# Patient Record
Sex: FEMALE | Race: BLACK OR AFRICAN AMERICAN | NOT HISPANIC OR LATINO | URBAN - METROPOLITAN AREA
[De-identification: names, ages, dates, MRNs, and addresses within clinical notes are randomized per-mention and may not be internally consistent; named-entity substitution may affect disease eponyms.]

---
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
## 2018-10-14 ENCOUNTER — EMERGENCY (EMERGENCY)
Facility: HOSPITAL | Age: 18
LOS: 1 days | Discharge: HOME | End: 2018-10-14
Admitting: EMERGENCY MEDICINE
Payer: MEDICAID

## 2018-10-14 VITALS
HEART RATE: 87 BPM | OXYGEN SATURATION: 99 % | TEMPERATURE: 99 F | DIASTOLIC BLOOD PRESSURE: 62 MMHG | RESPIRATION RATE: 20 BRPM | SYSTOLIC BLOOD PRESSURE: 125 MMHG

## 2018-10-14 PROBLEM — F32.9 MAJOR DEPRESSIVE DISORDER, SINGLE EPISODE, UNSPECIFIED: Chronic | Status: ACTIVE | Noted: 2017-04-15

## 2018-10-14 LAB
APPEARANCE UR: CLEAR — SIGNIFICANT CHANGE UP
BACTERIA # UR AUTO: NEGATIVE — SIGNIFICANT CHANGE UP
BILIRUB UR-MCNC: NEGATIVE — SIGNIFICANT CHANGE UP
BLOOD UR QL VISUAL: HIGH
COLOR SPEC: YELLOW — SIGNIFICANT CHANGE UP
GLUCOSE UR-MCNC: NEGATIVE — SIGNIFICANT CHANGE UP
HYALINE CASTS # UR AUTO: NEGATIVE — SIGNIFICANT CHANGE UP
KETONES UR-MCNC: NEGATIVE — SIGNIFICANT CHANGE UP
LEUKOCYTE ESTERASE UR-ACNC: NEGATIVE — SIGNIFICANT CHANGE UP
NITRITE UR-MCNC: NEGATIVE — SIGNIFICANT CHANGE UP
PH UR: 6 — SIGNIFICANT CHANGE UP (ref 5–8)
PROT UR-MCNC: 20 — SIGNIFICANT CHANGE UP
RBC CASTS # UR COMP ASSIST: >50 — HIGH (ref 0–?)
SP GR SPEC: 1.02 — SIGNIFICANT CHANGE UP (ref 1–1.04)
SQUAMOUS # UR AUTO: SIGNIFICANT CHANGE UP
UROBILINOGEN FLD QL: SIGNIFICANT CHANGE UP
WBC UR QL: SIGNIFICANT CHANGE UP (ref 0–?)

## 2018-10-14 PROCEDURE — 99283 EMERGENCY DEPT VISIT LOW MDM: CPT

## 2018-10-14 NOTE — ED ADULT TRIAGE NOTE - CHIEF COMPLAINT QUOTE
pt coming with Vag. bleeding started today LMP 10/3/18 pt stated been drinking alcohol x few days. last drink this AM .

## 2018-10-14 NOTE — ED PROVIDER NOTE - PROGRESS NOTE DETAILS
UCG negative.  UA- (+) blood, no leuks, no nitrites, no evidence of infection. Urine culture sent to lab. Pt instructed to follow up with GYN for irregular period. No ABX rx'ed from ED.

## 2018-10-14 NOTE — ED PROVIDER NOTE - NSFOLLOWUPINSTRUCTIONS_ED_ALL_ED_FT
Follow up with a primary care doctor within 48-72 hours. Follow up with your GYN within the week- you can call: [facility] Find a Physician helpline ([phone]) for assistance. Take Motrin 600mg every 6-8hrs as needed for pain with food. Worsening, continued or ANY new concerning symptoms return to the emergency department.

## 2018-10-14 NOTE — ED PROVIDER NOTE - MEDICAL DECISION MAKING DETAILS
18 y/o F with no pmh presenting with vaginal bleeding post normal mentrual period with suprapubic cramping r/o pregnancy- UCG, UA, Urine Culture, GYN follow up 16 y/o F with no pmh presenting with vaginal bleeding post normal menstrual period with suprapubic cramping r/o pregnancy- UCG, UA, Urine Culture, GYN follow up

## 2018-10-14 NOTE — ED PROVIDER NOTE - OBJECTIVE STATEMENT
18 y/o F no pmh presents with "vaginal bleeding". States that she got her period on 10/3/18. Has normal regular monthly periods and then started bleeding this am associated with suprapubic cramping.  States she drank heavily last night. Quen questioned on how much she states " A LOT" and won't define further. States she is sexually active and does not use condoms every time. Denies fever, chills, vaginal DC, dysuria, frequency of urination, urgency, abdominal pain, back pain.  Denies known exposure to an STD. Denies h/o STD's. 16 y/o F no pmh presents with "vaginal bleeding". States that she got her period on 10/3/18. Has normal regular monthly periods and then started bleeding this am associated with suprapubic cramping.  States she drank heavily last night. When I questioned on how much she states " A LOT" and won't define further. States she is sexually active and does not use condoms every time. Denies fever, chills, vaginal DC, dysuria, frequency of urination, urgency, abdominal pain, back pain.  Denies known exposure to an STD. Denies h/o STD's. States that she has recent frequent UTI's and has been on and off ABX.  Last ABX 2 months ago. Appears well NO distress.

## 2018-10-16 LAB
BACTERIA UR CULT: SIGNIFICANT CHANGE UP
SPECIMEN SOURCE: SIGNIFICANT CHANGE UP

## 2020-08-04 NOTE — ED ADULT TRIAGE NOTE - PAIN RATING/NUMBER SCALE (0-10): REST
August 4, 2020       Bharati Shore MD  [address] [place] [address]  [facility] [address]  VIA In Basket      Patient: Tomasa Olivares   YOB: 1984   Date of Visit: 8/4/2020       Dear Dr. Shore:    Thank you for referring Tomasa Olivares to me for evaluation. Below are my notes for this visit with her.    If you have questions, please do not hesitate to call me. I look forward to following your patient along with you.      Sincerely,        Khadra Concepcion RD        CC: No Recipients               4

## 2022-02-09 NOTE — ED PROVIDER NOTE - OBJECTIVE STATEMENT
[facility] SHORT STAY DISCHARGE SUMMARY NOTE    ADMISSION DATE:  2/8/2022  DISCHARGE DATE:  No discharge date for patient encounter.  DISCHARGING PHYSICIAN:  Rita Little CNP  ATTENDING PHYSICIAN:  Abraham Amaro MD    DISCHARGE DIAGNOSIS:  Atypical chest pain    DISCHARGE DISPOSITION:  home    CONDITION AT DISCHARGE:  stable    DISCHARGE INSTRUCTIONS:  DISCHARGE MEDICATIONS:  See Discharge Medication Reconciliation List  FOLLOW-UP:  PCP in 3-5 days  DIET:  cardiac diet  ACTIVITY:  activity as tolerated  SPECIAL INSTRUCTIONS:  Follow-up with cardiology in 1 to 2 weeks  Call PCP return to the ED with any new or worsening symptoms    Discharge instructions, medications and followup appointment were discussed with the patient and After Visit Summary was given.      CHRIS Falcon  [facility]patient [facility]  [phone]            15 yo female PMHx of depression presents to the ED c/o runny nose, sore throat, cough with one episode of minimal hemoptysis, pleuritic cp, gradual intermittent 6/10 throbbing occipital headache, chills, nausea, one episode of vomiting, fatigue symptoms began last night. Pt also complains of a malodorous urine, white 'cottage cheese like' vaginal discharge and is requesting testing for gonorrhea/chlamydia. Denies ear pain, changes in vision, eye drainage or discharge, SOB, 17 yo female PMHx of depression presents to the ED c/o runny nose, sore throat, cough with one episode of minimal hemoptysis, pleuritic cp, gradual intermittent 6/10 throbbing occipital headache, chills, nausea, one episode of vomiting, fatigue symptoms began last night. Pt also complains of a malodorous urine, white 'cottage cheese like' vaginal discharge and is requesting testing for gonorrhea/chlamydia. Denies ear pain, changes in vision, eye drainage or discharge, neck pain, back pain, SOB, STDS, thoughts of SI/ HI. Complex Repair And W Plasty Text: The defect edges were debeveled with a #15 scalpel blade.  The primary defect was closed partially with a complex linear closure.  Given the location of the remaining defect, shape of the defect and the proximity to free margins a W plasty was deemed most appropriate for complete closure of the defect.  Using a sterile surgical marker, an appropriate advancement flap was drawn incorporating the defect and placing the expected incisions within the relaxed skin tension lines where possible.    The area thus outlined was incised deep to adipose tissue with a #15 scalpel blade.  The skin margins were undermined to an appropriate distance in all directions utilizing iris scissors.

## 2023-12-19 PROBLEM — Z00.00 ENCOUNTER FOR PREVENTIVE HEALTH EXAMINATION: Status: ACTIVE | Noted: 2023-12-19

## 2023-12-20 ENCOUNTER — APPOINTMENT (OUTPATIENT)
Dept: ORTHOPEDIC SURGERY | Facility: CLINIC | Age: 23
End: 2023-12-20
Payer: MEDICAID

## 2023-12-20 DIAGNOSIS — Z78.9 OTHER SPECIFIED HEALTH STATUS: ICD-10-CM

## 2023-12-20 DIAGNOSIS — S61.112A LACERATION W/OUT FOREIGN BODY OF LEFT THUMB WITH DAMAGE TO NAIL, INITIAL ENCOUNTER: ICD-10-CM

## 2023-12-20 PROCEDURE — 99204 OFFICE O/P NEW MOD 45 MIN: CPT

## 2023-12-20 PROCEDURE — 73140 X-RAY EXAM OF FINGER(S): CPT | Mod: LT

## 2023-12-20 RX ORDER — IBUPROFEN 600 MG/1
600 TABLET, FILM COATED ORAL 3 TIMES DAILY
Qty: 60 | Refills: 0 | Status: ACTIVE | COMMUNITY
Start: 2023-12-20 | End: 1900-01-01

## 2023-12-20 NOTE — ASSESSMENT
[FreeTextEntry1] : Exam limited due to acrylic nail obscuring nail plate. I expressed my concern that the nail plate appears elevated off of the nail bed, and may be avulsed and interposed in the nail fold. Recommend avulsion of nail plate in office today to better evaluate injury and realign the nail plate under the nail fold if needed. However, patient declined.  Anticipate 3-4 months for nail plate to grow out, 1 year for full healing and stabilization of nail plate appearance, may have permanent nail deformity. Patient expressed understanding.  - ok to wash wound with soap and water. - wear alumafoam splint PRN at-risk activity. - activity as tolerated. - prescribed Ibuprofen 600mg TID PRN. Take with food. Reviewed contra-indicated medical conditions (eg liver disease, kidney disease, or GI ulcer/bleeding) or medications (eg blood thinners). Discussed possible GI and blood pressure side effects. - Work: wear splint.  F/u 2 weeks for suture removal.

## 2023-12-20 NOTE — IMAGING
[de-identified] : LEFT HAND thumb: complex laceration of nail fold. nail plate is obscured by overlying acrylic nail. sutures in place across nail fold into nail plate. no erythema or drainage. mild swelling of thumb distal phalanx. + TTP. flicker EPL, FPL. good finger extension, flex to full fist. good finger abduction and adduction.  numbness of thumb. SILT to other digits. palpable radial pulse, brisk cap refill all digits.   XRAYS OF LEFT THUMB: small calcification dorsal to distal phalanx tuft, possible fracture fragment.

## 2023-12-20 NOTE — HISTORY OF PRESENT ILLNESS
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 Slammed it in door on 12/14/23. Reports that she went to [facility] ER => wound sutured, alumafoam splint.  Hx: none. [] : no [FreeTextEntry5] : L. Thumb injury that occurred after slamming finger in door on 12/14/2023. Went to [facility] ER; sutures & splint applied; meds given. Was told of possible fx.

## 2023-12-21 PROBLEM — Z78.9 CURRENT NON-SMOKER: Status: ACTIVE | Noted: 2023-12-20

## 2024-09-10 ENCOUNTER — NON-APPOINTMENT (OUTPATIENT)
Age: 24
End: 2024-09-10